# Patient Record
Sex: FEMALE | URBAN - METROPOLITAN AREA
[De-identification: names, ages, dates, MRNs, and addresses within clinical notes are randomized per-mention and may not be internally consistent; named-entity substitution may affect disease eponyms.]

---

## 2019-11-09 ENCOUNTER — NURSE TRIAGE (OUTPATIENT)
Dept: ADMINISTRATIVE | Facility: CLINIC | Age: 24
End: 2019-11-09

## 2019-11-09 NOTE — TELEPHONE ENCOUNTER
"    Reason for Disposition   [1] Breast pain AND [2] cause is not known    Additional Information   Negative: Chest pain   Negative: Patient is breastfeeding   Negative: Postpartum breast pain and swelling, not breastfeeding   Negative: Small spot, skin growth or mole   Negative: [1] SEVERE breast pain AND [2] fever > 103 F (39.4 C)   Negative: Patient sounds very sick or weak to the triager   Negative: [1Breast looks infected (spreading redness, feels hot or painful to touch) AND [2] fever   Negative: [1Breast looks infected (spreading redness, feels hot or painful to touch) AND [2] no fever   Negative: [1] Painful rash AND [2] multiple small blisters grouped together (i.e., dermatomal distribution or "band" or "stripe")   Negative: [1] Cuts, burns, or bruises of breasts AND [2] suspicious history for the injury   Negative: Breast lump   Negative: [1] Nipple discharge AND [2] bloody   Negative: Nipple is inverted (i.e., points inward)  (Exception: long-term physical characteristic, present for many years)   Negative: Dry flaking-peeling skin of nipple   Negative: Change in shape or appearance of breast   Negative: Dimpling of skin of breast  (i.e., skin looks like the outside of an orange peel)   Negative: [1] Breast tenderness and fullness AND [2] pregnant   Negative: [1] Nipple discharge AND [2] not bloody (e.g., clear, white, yellow, brown, green)    Protocols used: BREAST SYMPTOMS-A-AH   Pt stated she has pain in her right breast today of unknown cause. Pt answered "no" to triage questions above. Advised to see PCP within 2 weeks. Care advice, and when to call back provided directly from the protocol. Pt verbalized understanding.    "